# Patient Record
Sex: FEMALE | Race: WHITE | NOT HISPANIC OR LATINO | Employment: PART TIME | URBAN - METROPOLITAN AREA
[De-identification: names, ages, dates, MRNs, and addresses within clinical notes are randomized per-mention and may not be internally consistent; named-entity substitution may affect disease eponyms.]

---

## 2017-06-12 ENCOUNTER — RECORDS - HEALTHEAST (OUTPATIENT)
Dept: LAB | Facility: CLINIC | Age: 22
End: 2017-06-12

## 2017-06-12 LAB — HIV 1+2 AB+HIV1 P24 AG SERPL QL IA: NEGATIVE

## 2017-06-13 LAB — SYPHILIS RPR SCREEN - HISTORICAL: NORMAL

## 2017-06-15 LAB
HPV INTERPRETATION - HISTORICAL: NORMAL
HPV INTERPRETER - HISTORICAL: NORMAL

## 2019-03-05 ENCOUNTER — RECORDS - HEALTHEAST (OUTPATIENT)
Dept: LAB | Facility: CLINIC | Age: 24
End: 2019-03-05

## 2019-03-07 LAB — BACTERIA SPEC CULT: NO GROWTH

## 2019-03-14 ENCOUNTER — RECORDS - HEALTHEAST (OUTPATIENT)
Dept: LAB | Facility: CLINIC | Age: 24
End: 2019-03-14

## 2019-03-15 LAB — C TRACH DNA SPEC QL PROBE+SIG AMP: NEGATIVE

## 2019-03-18 LAB
M HOMINIS DNA SPEC QL NAA+PROBE: NEGATIVE
SPECIMEN SOURCE: ABNORMAL
SPECIMEN SOURCE: NORMAL
U PARVUM DNA SPEC QL NAA+PROBE: POSITIVE
U UREALYTICUM DNA SPEC QL NAA+PROBE: NEGATIVE

## 2020-01-22 ENCOUNTER — RECORDS - HEALTHEAST (OUTPATIENT)
Dept: LAB | Facility: CLINIC | Age: 25
End: 2020-01-22

## 2020-01-23 LAB — C TRACH DNA SPEC QL PROBE+SIG AMP: NEGATIVE

## 2021-03-15 ENCOUNTER — RECORDS - HEALTHEAST (OUTPATIENT)
Dept: LAB | Facility: CLINIC | Age: 26
End: 2021-03-15

## 2021-03-15 LAB
ALBUMIN SERPL-MCNC: 3.9 G/DL (ref 3.5–5)
ALP SERPL-CCNC: 59 U/L (ref 45–120)
ALT SERPL W P-5'-P-CCNC: 12 U/L (ref 0–45)
ANION GAP SERPL CALCULATED.3IONS-SCNC: 10 MMOL/L (ref 5–18)
AST SERPL W P-5'-P-CCNC: 15 U/L (ref 0–40)
BILIRUB SERPL-MCNC: 0.4 MG/DL (ref 0–1)
BUN SERPL-MCNC: 17 MG/DL (ref 8–22)
CALCIUM SERPL-MCNC: 9.1 MG/DL (ref 8.5–10.5)
CHLORIDE BLD-SCNC: 110 MMOL/L (ref 98–107)
CO2 SERPL-SCNC: 22 MMOL/L (ref 22–31)
CREAT SERPL-MCNC: 0.95 MG/DL (ref 0.6–1.1)
GFR SERPL CREATININE-BSD FRML MDRD: >60 ML/MIN/1.73M2
GLUCOSE BLD-MCNC: 93 MG/DL (ref 70–125)
HIV 1+2 AB+HIV1 P24 AG SERPL QL IA: NEGATIVE
POTASSIUM BLD-SCNC: 4.3 MMOL/L (ref 3.5–5)
PROT SERPL-MCNC: 6.8 G/DL (ref 6–8)
SODIUM SERPL-SCNC: 142 MMOL/L (ref 136–145)

## 2021-03-16 LAB
C TRACH DNA SPEC QL PROBE+SIG AMP: NEGATIVE
N GONORRHOEA DNA SPEC QL NAA+PROBE: NEGATIVE
T PALLIDUM AB SER QL: NEGATIVE

## 2021-03-18 LAB — LAMOTRIGINE SERPL-MCNC: 3.6 UG/ML (ref 2.5–15)

## 2021-10-15 ENCOUNTER — LAB REQUISITION (OUTPATIENT)
Dept: LAB | Facility: CLINIC | Age: 26
End: 2021-10-15
Payer: COMMERCIAL

## 2021-10-15 DIAGNOSIS — F39 UNSPECIFIED MOOD (AFFECTIVE) DISORDER (H): ICD-10-CM

## 2021-10-15 LAB — TSH SERPL DL<=0.005 MIU/L-ACNC: 2.12 UIU/ML (ref 0.3–5)

## 2021-10-15 PROCEDURE — 84443 ASSAY THYROID STIM HORMONE: CPT | Mod: ORL | Performed by: FAMILY MEDICINE

## 2022-02-21 ENCOUNTER — LAB REQUISITION (OUTPATIENT)
Dept: LAB | Facility: CLINIC | Age: 27
End: 2022-02-21

## 2022-02-21 DIAGNOSIS — F39 UNSPECIFIED MOOD (AFFECTIVE) DISORDER (H): ICD-10-CM

## 2022-02-21 LAB
ALBUMIN SERPL-MCNC: 4 G/DL (ref 3.5–5)
ALP SERPL-CCNC: 52 U/L (ref 45–120)
ALT SERPL W P-5'-P-CCNC: 11 U/L (ref 0–45)
ANION GAP SERPL CALCULATED.3IONS-SCNC: 9 MMOL/L (ref 5–18)
AST SERPL W P-5'-P-CCNC: 17 U/L (ref 0–40)
BILIRUB SERPL-MCNC: 0.6 MG/DL (ref 0–1)
BUN SERPL-MCNC: 13 MG/DL (ref 8–22)
CALCIUM SERPL-MCNC: 9.3 MG/DL (ref 8.5–10.5)
CHLORIDE BLD-SCNC: 109 MMOL/L (ref 98–107)
CO2 SERPL-SCNC: 21 MMOL/L (ref 22–31)
CREAT SERPL-MCNC: 0.83 MG/DL (ref 0.6–1.1)
GFR SERPL CREATININE-BSD FRML MDRD: >90 ML/MIN/1.73M2
GLUCOSE BLD-MCNC: 78 MG/DL (ref 70–125)
POTASSIUM BLD-SCNC: 4 MMOL/L (ref 3.5–5)
PROT SERPL-MCNC: 6.9 G/DL (ref 6–8)
SODIUM SERPL-SCNC: 139 MMOL/L (ref 136–145)
TSH SERPL DL<=0.005 MIU/L-ACNC: 2.67 UIU/ML (ref 0.3–5)

## 2022-02-21 PROCEDURE — 80053 COMPREHEN METABOLIC PANEL: CPT | Performed by: FAMILY MEDICINE

## 2022-02-21 PROCEDURE — 80201 ASSAY OF TOPIRAMATE: CPT | Performed by: FAMILY MEDICINE

## 2022-02-21 PROCEDURE — 84443 ASSAY THYROID STIM HORMONE: CPT | Performed by: FAMILY MEDICINE

## 2022-02-21 PROCEDURE — 80175 DRUG SCREEN QUAN LAMOTRIGINE: CPT | Performed by: FAMILY MEDICINE

## 2022-02-28 LAB
LAMOTRIGINE SERPL-MCNC: 3.3 UG/ML
TOPIRAMATE SERPL-MCNC: 6.2 UG/ML

## 2022-04-12 ENCOUNTER — LAB REQUISITION (OUTPATIENT)
Dept: LAB | Facility: CLINIC | Age: 27
End: 2022-04-12
Payer: COMMERCIAL

## 2022-04-12 DIAGNOSIS — Z11.3 ENCOUNTER FOR SCREENING FOR INFECTIONS WITH A PREDOMINANTLY SEXUAL MODE OF TRANSMISSION: ICD-10-CM

## 2022-04-12 PROCEDURE — 87591 N.GONORRHOEAE DNA AMP PROB: CPT | Mod: ORL | Performed by: FAMILY MEDICINE

## 2022-04-12 PROCEDURE — 87491 CHLMYD TRACH DNA AMP PROBE: CPT | Mod: ORL | Performed by: FAMILY MEDICINE

## 2022-04-13 LAB
C TRACH DNA SPEC QL PROBE+SIG AMP: NEGATIVE
N GONORRHOEA DNA SPEC QL NAA+PROBE: NEGATIVE

## 2023-02-10 ENCOUNTER — LAB REQUISITION (OUTPATIENT)
Dept: LAB | Facility: CLINIC | Age: 28
End: 2023-02-10
Payer: COMMERCIAL

## 2023-02-10 DIAGNOSIS — Z01.419 ENCOUNTER FOR GYNECOLOGICAL EXAMINATION (GENERAL) (ROUTINE) WITHOUT ABNORMAL FINDINGS: ICD-10-CM

## 2023-02-10 PROCEDURE — G0145 SCR C/V CYTO,THINLAYER,RESCR: HCPCS | Mod: ORL | Performed by: PHYSICIAN ASSISTANT

## 2023-02-15 LAB
BKR LAB AP GYN ADEQUACY: NORMAL
BKR LAB AP GYN INTERPRETATION: NORMAL
BKR LAB AP HPV REFLEX: NORMAL
BKR LAB AP PREVIOUS ABNORMAL: NORMAL
PATH REPORT.COMMENTS IMP SPEC: NORMAL
PATH REPORT.COMMENTS IMP SPEC: NORMAL
PATH REPORT.RELEVANT HX SPEC: NORMAL

## 2023-05-26 ENCOUNTER — LAB REQUISITION (OUTPATIENT)
Dept: LAB | Facility: CLINIC | Age: 28
End: 2023-05-26
Payer: COMMERCIAL

## 2023-05-26 DIAGNOSIS — F39 UNSPECIFIED MOOD (AFFECTIVE) DISORDER (H): ICD-10-CM

## 2023-05-26 LAB
ALBUMIN SERPL BCG-MCNC: 4.4 G/DL (ref 3.5–5.2)
ALP SERPL-CCNC: 56 U/L (ref 35–104)
ALT SERPL W P-5'-P-CCNC: 14 U/L (ref 10–35)
ANION GAP SERPL CALCULATED.3IONS-SCNC: 11 MMOL/L (ref 7–15)
AST SERPL W P-5'-P-CCNC: 18 U/L (ref 10–35)
BILIRUB SERPL-MCNC: 0.3 MG/DL
BUN SERPL-MCNC: 13.3 MG/DL (ref 6–20)
CALCIUM SERPL-MCNC: 9.2 MG/DL (ref 8.6–10)
CHLORIDE SERPL-SCNC: 107 MMOL/L (ref 98–107)
CREAT SERPL-MCNC: 0.91 MG/DL (ref 0.51–0.95)
DEPRECATED HCO3 PLAS-SCNC: 21 MMOL/L (ref 22–29)
GFR SERPL CREATININE-BSD FRML MDRD: 89 ML/MIN/1.73M2
GLUCOSE SERPL-MCNC: 91 MG/DL (ref 70–99)
POTASSIUM SERPL-SCNC: 4 MMOL/L (ref 3.4–5.3)
PROT SERPL-MCNC: 6.7 G/DL (ref 6.4–8.3)
SODIUM SERPL-SCNC: 139 MMOL/L (ref 136–145)

## 2023-05-26 PROCEDURE — 80201 ASSAY OF TOPIRAMATE: CPT | Mod: ORL | Performed by: FAMILY MEDICINE

## 2023-05-26 PROCEDURE — 80053 COMPREHEN METABOLIC PANEL: CPT | Mod: ORL | Performed by: FAMILY MEDICINE

## 2023-05-26 PROCEDURE — 80175 DRUG SCREEN QUAN LAMOTRIGINE: CPT | Mod: ORL | Performed by: FAMILY MEDICINE

## 2023-05-27 LAB
LAMOTRIGINE SERPL-MCNC: 4.7 UG/ML
TOPIRAMATE SERPL-MCNC: 5.8 UG/ML

## 2024-04-09 ENCOUNTER — LAB REQUISITION (OUTPATIENT)
Dept: LAB | Facility: CLINIC | Age: 29
End: 2024-04-09
Payer: COMMERCIAL

## 2024-04-09 DIAGNOSIS — Z11.3 ENCOUNTER FOR SCREENING FOR INFECTIONS WITH A PREDOMINANTLY SEXUAL MODE OF TRANSMISSION: ICD-10-CM

## 2024-04-09 PROCEDURE — 87389 HIV-1 AG W/HIV-1&-2 AB AG IA: CPT | Mod: ORL | Performed by: STUDENT IN AN ORGANIZED HEALTH CARE EDUCATION/TRAINING PROGRAM

## 2024-04-09 PROCEDURE — 86780 TREPONEMA PALLIDUM: CPT | Mod: ORL | Performed by: STUDENT IN AN ORGANIZED HEALTH CARE EDUCATION/TRAINING PROGRAM

## 2024-04-09 PROCEDURE — 87491 CHLMYD TRACH DNA AMP PROBE: CPT | Mod: ORL | Performed by: STUDENT IN AN ORGANIZED HEALTH CARE EDUCATION/TRAINING PROGRAM

## 2024-04-09 PROCEDURE — 86803 HEPATITIS C AB TEST: CPT | Mod: ORL | Performed by: STUDENT IN AN ORGANIZED HEALTH CARE EDUCATION/TRAINING PROGRAM

## 2024-04-10 LAB
C TRACH DNA SPEC QL PROBE+SIG AMP: NEGATIVE
HCV AB SERPL QL IA: NONREACTIVE
HIV 1+2 AB+HIV1 P24 AG SERPL QL IA: NONREACTIVE
N GONORRHOEA DNA SPEC QL NAA+PROBE: NEGATIVE
T PALLIDUM AB SER QL: NONREACTIVE

## 2024-05-07 ENCOUNTER — LAB REQUISITION (OUTPATIENT)
Dept: LAB | Facility: CLINIC | Age: 29
End: 2024-05-07
Payer: COMMERCIAL

## 2024-05-07 DIAGNOSIS — Z11.3 ENCOUNTER FOR SCREENING FOR INFECTIONS WITH A PREDOMINANTLY SEXUAL MODE OF TRANSMISSION: ICD-10-CM

## 2024-05-07 PROCEDURE — 87491 CHLMYD TRACH DNA AMP PROBE: CPT | Mod: ORL | Performed by: FAMILY MEDICINE

## 2024-05-09 LAB
C TRACH DNA SPEC QL PROBE+SIG AMP: NEGATIVE
N GONORRHOEA DNA SPEC QL NAA+PROBE: NEGATIVE

## 2024-06-03 ENCOUNTER — LAB REQUISITION (OUTPATIENT)
Dept: LAB | Facility: CLINIC | Age: 29
End: 2024-06-03
Payer: COMMERCIAL

## 2024-06-03 ENCOUNTER — TRANSFERRED RECORDS (OUTPATIENT)
Dept: HEALTH INFORMATION MANAGEMENT | Facility: CLINIC | Age: 29
End: 2024-06-03

## 2024-06-03 DIAGNOSIS — F31.12 BIPOLAR DISORDER, CURRENT EPISODE MANIC WITHOUT PSYCHOTIC FEATURES, MODERATE (H): ICD-10-CM

## 2024-06-03 LAB
LITHIUM SERPL-SCNC: 0.37 MMOL/L (ref 0.6–1.2)
PHQ9 SCORE: 1

## 2024-06-03 PROCEDURE — 80178 ASSAY OF LITHIUM: CPT | Mod: ORL | Performed by: FAMILY MEDICINE

## 2024-06-03 PROCEDURE — 80048 BASIC METABOLIC PNL TOTAL CA: CPT | Mod: ORL | Performed by: FAMILY MEDICINE

## 2024-06-03 PROCEDURE — 84443 ASSAY THYROID STIM HORMONE: CPT | Mod: ORL | Performed by: FAMILY MEDICINE

## 2024-06-03 PROCEDURE — 80175 DRUG SCREEN QUAN LAMOTRIGINE: CPT | Mod: ORL | Performed by: FAMILY MEDICINE

## 2024-06-04 ENCOUNTER — MEDICAL CORRESPONDENCE (OUTPATIENT)
Dept: HEALTH INFORMATION MANAGEMENT | Facility: CLINIC | Age: 29
End: 2024-06-04
Payer: COMMERCIAL

## 2024-06-04 LAB
ANION GAP SERPL CALCULATED.3IONS-SCNC: 11 MMOL/L (ref 7–15)
BUN SERPL-MCNC: 13.3 MG/DL (ref 6–20)
CALCIUM SERPL-MCNC: 9.5 MG/DL (ref 8.6–10)
CHLORIDE SERPL-SCNC: 109 MMOL/L (ref 98–107)
CREAT SERPL-MCNC: 0.92 MG/DL (ref 0.51–0.95)
DEPRECATED HCO3 PLAS-SCNC: 19 MMOL/L (ref 22–29)
EGFRCR SERPLBLD CKD-EPI 2021: 87 ML/MIN/1.73M2
GLUCOSE SERPL-MCNC: 87 MG/DL (ref 70–99)
POTASSIUM SERPL-SCNC: 4.1 MMOL/L (ref 3.4–5.3)
SODIUM SERPL-SCNC: 139 MMOL/L (ref 135–145)
TSH SERPL DL<=0.005 MIU/L-ACNC: 2.65 UIU/ML (ref 0.3–4.2)

## 2024-06-05 ENCOUNTER — TRANSCRIBE ORDERS (OUTPATIENT)
Dept: OTHER | Age: 29
End: 2024-06-05

## 2024-06-05 DIAGNOSIS — F31.12 BIPOLAR AFFECTIVE DISORDER, CURRENTLY MANIC, MODERATE (H): Primary | ICD-10-CM

## 2024-06-05 LAB — LAMOTRIGINE SERPL-MCNC: 2.4 UG/ML

## 2024-07-11 ENCOUNTER — VIRTUAL VISIT (OUTPATIENT)
Dept: NEUROLOGY | Facility: CLINIC | Age: 29
End: 2024-07-11
Payer: COMMERCIAL

## 2024-07-11 VITALS — WEIGHT: 116 LBS | BODY MASS INDEX: 21.35 KG/M2 | HEIGHT: 62 IN

## 2024-07-11 DIAGNOSIS — F39 MOOD DISORDER (H): Primary | ICD-10-CM

## 2024-07-11 PROCEDURE — 99204 OFFICE O/P NEW MOD 45 MIN: CPT | Mod: 95 | Performed by: PSYCHIATRY & NEUROLOGY

## 2024-07-11 PROCEDURE — G2211 COMPLEX E/M VISIT ADD ON: HCPCS | Mod: 95 | Performed by: PSYCHIATRY & NEUROLOGY

## 2024-07-11 RX ORDER — LAMOTRIGINE 100 MG/1
TABLET, EXTENDED RELEASE ORAL
COMMUNITY
Start: 2024-04-19

## 2024-07-11 RX ORDER — TOPIRAMATE 100 MG/1
TABLET, FILM COATED ORAL
COMMUNITY
Start: 2024-03-21

## 2024-07-11 RX ORDER — TOPIRAMATE 50 MG/1
TABLET, FILM COATED ORAL
COMMUNITY
Start: 2023-09-25

## 2024-07-11 RX ORDER — FEXOFENADINE HYDROCHLORIDE 180 MG/1
TABLET, FILM COATED ORAL
COMMUNITY
Start: 2024-04-12

## 2024-07-11 RX ORDER — DESOGESTREL AND ETHINYL ESTRADIOL 0.15-0.03
KIT ORAL
COMMUNITY
Start: 2024-04-12

## 2024-07-11 ASSESSMENT — PAIN SCALES - GENERAL: PAINLEVEL: NO PAIN (0)

## 2024-07-11 NOTE — LETTER
7/11/2024      Ana Vanegas  [address]  [place] [address]      Dear Colleague,    Thank you for referring your patient, Ana Vanegas, to the [facility]. Please see a copy of my visit note below.    Virtual Visit Details    Type of service:  Video Visit   Video Start Time: 10:26 AM  Video End Time: 11:01 AM    Originating Location (pt. Location): The patient's home  Distant Location (provider location):    Platform used for Video Visit: [employer]    Demographic information:     HPI:      The patient presents to this clinic for an intake on July 11, 2024.  She was referred by Dr. Lee from [facility] with a possible diagnosis of bipolar affective disorder type II, she was previously diagnosed with major depressive disorder.  She also was previously diagnosed with an eating disorder with binge eating episodes and was followed years ago through the family clinic briefly.  The patient presented on Topamax I believe 50 mg in the morning and 100 mg at night.  Zoloft 50 mg and lamotrigine 100 mg 24-hour tablet a day although we did not have verification of those medications yet.  There was limited collateral information available at the time of the intake but the patient apparently had been diagnosed and treated for major depressive disorder for many years and has been through the [place] program as an outpatient 5 years ago with binge eating symptoms.  She has been treated with a variety of medications, it appears mostly for depressive and anxious symptoms but a few years ago was started on Lamictal.  She is also on Topamax which was used in part due to her eating disorder symptoms apparently.  She reports that over the last year the Lamictal dosage has been decreased.  She was briefly tried on lithium a few weeks earlier but stopped that because she was worried about the potential for weight gain as currently her weight and eating behaviors are under  very good control.  Her last Lamictal level on Micki 3 was 2.4 with reassuring electrolytes and TSH levels.  At the time of the intake she was not looking for any medication changes and stated she was doing relatively well.  She stated she had, what appeared to be, a manic episode 2 months earlier when she got a DUI.  She also had been engaged in more promiscuous behavior and had broken up recently with her boyfriend because of this.  She reports that she had been gaping more with nicotine and drinking a lot more.  She states that since her DUI she has not used any alcohol and feels as if things are currently stable but she is worried about a future episode and was hoping to establish with a psychiatrist as per the recommendation of Dr. Lee.    HPI:  Please see above.  The patient presents here stating she was referred by Dr. Lee.  I will try and clarify whether this is for a second opinion or whether she wishes to establish long-term care through this clinic.  The patient apparently was diagnosed with bipolar affective disorder type II and a month or 2 earlier started to develop more hypomanic and possibly manic symptoms.  She was arrested for a DUI at that time and has engaged in more impulsive actions.  She had briefly been tried on lithium but she did not like that medication so stopped it.  She has been on Topamax chronically and continues on that medication.  She denies any history of any suicidality.  No history of any psychosis.  She states that she started developing depressive and anxious symptoms years ago but only recently developed manic or hypomanic symptoms.  She denies having any thoughts of harming herself or anybody else.  No hallucinations or delusions.  She states a month or 2 ago she was sleeping less but now is sleeping her typical 6 hours a night.  She has stopped using alcohol and has been getting better sleep and believes things have normalized.  She denies having any significant  cognitive deficits.  No obvious tremors or side effects to the medication.  She was unsure of any past medication trials but does not believe she has been on other mood stabilizing agents.    Past medical history:  Allergies: Amoxicillin  Chronic medical issues: The patient denies having any recent or chronic medical concerns.  She states she is chronically run low blood pressures but has been asymptomatic.  No history of any traumatic brain injuries no history of any diabetes or hypertension.  Recent medical concerns: Patient denies that has been any recent change in her medical status.  No new health concerns.    Past psychiatric history:  The patient has been treated for bipolar affective disorder type II through her primary care physician Dr. Lee.  She may have had an eating disorder years ago but that recently has been under fairly good control.  No history of any psychiatric hospitalizations.  No history of any suicidality or psychosis.  She is engaged in some impulsive and reckless behaviors over the last month or 2 which has led to consideration for a bipolar type II diagnoses.  She has never been psychiatrically hospitalized.  She was diagnosed with a binge eating disorder in the past which is why she was placed on Topamax.  She apparently had been on higher doses of that medication a year ago.  I had the process of trying to obtain collateral information.    Chemical dependency issues:  Please see above.  The patient had a recent DUI about a month or 2 earlier which she believes was related to a manic or hypomanic episode when she was under other situational stressors.  She has not had anything to drink since then.  No history of any illicit drug use or prescription medication misuse.  She has never undergone chemical dependency assessment or treatment otherwise.    Family history:  She reports her father may have had undiagnosed bipolar affective disorder.  He is .  There is no family  history of any chemical dependency issues or other members with bipolar illness.  No history of any psychosis.    Social history:   The patient was born and raised in [place].  She has an older brother and a younger sister.  Her parents were never  but her father  7 years earlier.  Her dad struggled with some issues around hoarding and may have had bipolar illness.  As a child she described herself as quite anxious and a perfectionist.  She is obtained a college degree and is currently in a master's program for dairy nutrition.  She recently broke up with a fiancé and had been living with him so moved back home with her mother.  That is going well and she describes a very close family.  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    Review of systems:  The patient denies having any recent health concerns or complaints. No shortness of breath. No chest pain. No abnormal movements or tremors. No significant change in their health status.      Mental status exam:  Appearance:   Comfortable and calm. Adequately groomed. No evidence of any distress or discomfort. No asymmetries and no tremors.  Behavior:   Able to participate. No agitation. No irritability or lability. Appears interested. No reports of any recent behavior problems.  Speech: Communicating appropriately. Not pressured. Not rambling. Able to dialogue.  Mood/Affect: Does not appear to be any significant distress. No lability or irritability. No agitation. Not significantly depressed. No jerome.  Thought Content: No hallucinations or delusions. No apparent hallucinations   Suicidal or Homicidal Thoughts:  No suicidal or homicidal ideation.  Thought Process/Formulation:   Able to track and follow appropriately. No racing thoughts. Participating appropriately.  Associations:  Appropriate in conversation. No obvious deficits.  Fund of Knowledge:  No obvious change or significant deficits noted.  Attention/Concentration:    Grossly intact. Tracking adequately. No racing thoughts. Not disorganized.  Insight:  Grossly intact. Adequate. No obvious change.  Judgement:  Grossly intact.   Memory:  Grossly intact.   Motor Status:  No recent change reported. No asymmetry. No current tremor.  Orientation: Grossly oriented.     Diagnoses:  Bipolar affective disorder, type II, by history  Major depressive disorder, NOS, by history  Eating disorder, by history, not currently active      Assessment:  The patient presents with history and recent treatments as described above.  She apparently had been on 300 mg of Lamictal over a year ago but is now on 100 mg a day.  She may have had some manic symptoms present a few weeks earlier but that has resolved with abstinence from alcohol and better sleep.  There is no evidence of any suicidality or psychosis.  She seems to be tolerating the medication and was referred here for further assessment and I believe possible treatment through this clinic.  I will try and clarify that.  She does not feel she needs any medication changes at this time.    Plan:  I will make no medication changes at this time and we will attempt to clarify recent treatments and obtain her old records.  She reports that she had never been on other mood stabilizing agents but apparently did not tolerate lithium or at least had concerns about the potential for weight gain.  Her impulse control issues which occurred a couple of months prior may have been related to excessive alcohol use and lack of sleep.  She seems to be doing relatively well at this time.  She has never had prior trials of Seroquel, risperidone, Zyprexa or any other mood stabilizing agents but I do not have her old records yet.  I will make no medication changes at this time.  We will have the patient return to this clinic in 3 to 4 months for further assessment and she agrees to contact us before then with any concerns.  I did discuss, with the patient, the risks and  benefits of the medication and treatment plan. The patient agreed to utilize emergency services should that become necessary. The patient agreed to contact us with any nonemergent questions or concerns prior to the next visit. We discussed utilization of the messaging system in this platform as well as utilization of the 911 service should that become necessary.    Lopez Walter MD      Again, thank you for allowing me to participate in the care of your patient.        Sincerely,        Lopez Walter MD

## 2024-07-11 NOTE — NURSING NOTE
Current patient location: Patient declined to provide     Is the patient currently in the state of [place]? YES    Visit mode:VIDEO    If the visit is dropped, the patient can be reconnected by: VIDEO VISIT: Text to cell phone:   Telephone Information:   Mobile [phone]       Will anyone else be joining the visit? NO  (If patient encounters technical issues they should call [phone]    How would you like to obtain your AVS? MyChart    Are changes needed to the allergy or medication list? No    Are refills needed on medications prescribed by this physician? NO    Reason for visit: Consult    Comfort [facility]

## 2024-07-11 NOTE — PROGRESS NOTES
Virtual Visit Details    Type of service:  Video Visit   Video Start Time: 10:26 AM  Video End Time: 11:01 AM    Originating Location (pt. Location): The patient's home  Distant Location (provider location):    Platform used for Video Visit: [employer]    Demographic information:     HPI:      The patient presents to this clinic for an intake on July 11, 2024.  She was referred by Dr. Lee from [facility] with a possible diagnosis of bipolar affective disorder type II, she was previously diagnosed with major depressive disorder.  She also was previously diagnosed with an eating disorder with binge eating episodes and was followed years ago through the family clinic briefly.  The patient presented on Topamax I believe 50 mg in the morning and 100 mg at night.  Zoloft 50 mg and lamotrigine 100 mg 24-hour tablet a day although we did not have verification of those medications yet.  There was limited collateral information available at the time of the intake but the patient apparently had been diagnosed and treated for major depressive disorder for many years and has been through the [place] program as an outpatient 5 years ago with binge eating symptoms.  She has been treated with a variety of medications, it appears mostly for depressive and anxious symptoms but a few years ago was started on Lamictal.  She is also on Topamax which was used in part due to her eating disorder symptoms apparently.  She reports that over the last year the Lamictal dosage has been decreased.  She was briefly tried on lithium a few weeks earlier but stopped that because she was worried about the potential for weight gain as currently her weight and eating behaviors are under very good control.  Her last Lamictal level on Micki 3 was 2.4 with reassuring electrolytes and TSH levels.  At the time of the intake she was not looking for any medication changes and stated she was doing relatively well.  She stated she had, what appeared to be, a  manic episode 2 months earlier when she got a DUI.  She also had been engaged in more promiscuous behavior and had broken up recently with her boyfriend because of this.  She reports that she had been gaping more with nicotine and drinking a lot more.  She states that since her DUI she has not used any alcohol and feels as if things are currently stable but she is worried about a future episode and was hoping to establish with a psychiatrist as per the recommendation of Dr. Lee.    HPI:  Please see above.  The patient presents here stating she was referred by Dr. Lee.  I will try and clarify whether this is for a second opinion or whether she wishes to establish long-term care through this clinic.  The patient apparently was diagnosed with bipolar affective disorder type II and a month or 2 earlier started to develop more hypomanic and possibly manic symptoms.  She was arrested for a DUI at that time and has engaged in more impulsive actions.  She had briefly been tried on lithium but she did not like that medication so stopped it.  She has been on Topamax chronically and continues on that medication.  She denies any history of any suicidality.  No history of any psychosis.  She states that she started developing depressive and anxious symptoms years ago but only recently developed manic or hypomanic symptoms.  She denies having any thoughts of harming herself or anybody else.  No hallucinations or delusions.  She states a month or 2 ago she was sleeping less but now is sleeping her typical 6 hours a night.  She has stopped using alcohol and has been getting better sleep and believes things have normalized.  She denies having any significant cognitive deficits.  No obvious tremors or side effects to the medication.  She was unsure of any past medication trials but does not believe she has been on other mood stabilizing agents.    Past medical history:  Allergies: Amoxicillin  Chronic medical issues: The  patient denies having any recent or chronic medical concerns.  She states she is chronically run low blood pressures but has been asymptomatic.  No history of any traumatic brain injuries no history of any diabetes or hypertension.  Recent medical concerns: Patient denies that has been any recent change in her medical status.  No new health concerns.    Past psychiatric history:  The patient has been treated for bipolar affective disorder type II through her primary care physician Dr. Lee.  She may have had an eating disorder years ago but that recently has been under fairly good control.  No history of any psychiatric hospitalizations.  No history of any suicidality or psychosis.  She is engaged in some impulsive and reckless behaviors over the last month or 2 which has led to consideration for a bipolar type II diagnoses.  She has never been psychiatrically hospitalized.  She was diagnosed with a binge eating disorder in the past which is why she was placed on Topamax.  She apparently had been on higher doses of that medication a year ago.  I had the process of trying to obtain collateral information.    Chemical dependency issues:  Please see above.  The patient had a recent DUI about a month or 2 earlier which she believes was related to a manic or hypomanic episode when she was under other situational stressors.  She has not had anything to drink since then.  No history of any illicit drug use or prescription medication misuse.  She has never undergone chemical dependency assessment or treatment otherwise.    Family history:  She reports her father may have had undiagnosed bipolar affective disorder.  He is .  There is no family history of any chemical dependency issues or other members with bipolar illness.  No history of any psychosis.    Social history:   The patient was born and raised in [place].  She has an older brother and a younger sister.  Her parents were never  but her  father  7 years earlier.  Her dad struggled with some issues around hoarding and may have had bipolar illness.  As a child she described herself as quite anxious and a perfectionist.  She is obtained a college degree and is currently in a master's program for dairy nutrition.  She recently broke up with a fiancé and had been living with him so moved back home with her mother.  That is going well and she describes a very close family.  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    Review of systems:  The patient denies having any recent health concerns or complaints. No shortness of breath. No chest pain. No abnormal movements or tremors. No significant change in their health status.      Mental status exam:  Appearance:   Comfortable and calm. Adequately groomed. No evidence of any distress or discomfort. No asymmetries and no tremors.  Behavior:   Able to participate. No agitation. No irritability or lability. Appears interested. No reports of any recent behavior problems.  Speech: Communicating appropriately. Not pressured. Not rambling. Able to dialogue.  Mood/Affect: Does not appear to be any significant distress. No lability or irritability. No agitation. Not significantly depressed. No jerome.  Thought Content: No hallucinations or delusions. No apparent hallucinations   Suicidal or Homicidal Thoughts:  No suicidal or homicidal ideation.  Thought Process/Formulation:   Able to track and follow appropriately. No racing thoughts. Participating appropriately.  Associations:  Appropriate in conversation. No obvious deficits.  Fund of Knowledge:  No obvious change or significant deficits noted.  Attention/Concentration:   Grossly intact. Tracking adequately. No racing thoughts. Not disorganized.  Insight:  Grossly intact. Adequate. No obvious change.  Judgement:  Grossly intact.   Memory:  Grossly intact.   Motor Status:  No recent change reported. No asymmetry. No current  tremor.  Orientation: Grossly oriented.     Diagnoses:  Bipolar affective disorder, type II, by history  Major depressive disorder, NOS, by history  Eating disorder, by history, not currently active      Assessment:  The patient presents with history and recent treatments as described above.  She apparently had been on 300 mg of Lamictal over a year ago but is now on 100 mg a day.  She may have had some manic symptoms present a few weeks earlier but that has resolved with abstinence from alcohol and better sleep.  There is no evidence of any suicidality or psychosis.  She seems to be tolerating the medication and was referred here for further assessment and I believe possible treatment through this clinic.  I will try and clarify that.  She does not feel she needs any medication changes at this time.    Plan:  I will make no medication changes at this time and we will attempt to clarify recent treatments and obtain her old records.  She reports that she had never been on other mood stabilizing agents but apparently did not tolerate lithium or at least had concerns about the potential for weight gain.  Her impulse control issues which occurred a couple of months prior may have been related to excessive alcohol use and lack of sleep.  She seems to be doing relatively well at this time.  She has never had prior trials of Seroquel, risperidone, Zyprexa or any other mood stabilizing agents but I do not have her old records yet.  I will make no medication changes at this time.  We will have the patient return to this clinic in 3 to 4 months for further assessment and she agrees to contact us before then with any concerns.  I did discuss, with the patient, the risks and benefits of the medication and treatment plan. The patient agreed to utilize emergency services should that become necessary. The patient agreed to contact us with any nonemergent questions or concerns prior to the next visit. We discussed utilization of  the messaging system in this platform as well as utilization of the [employer] service should that become necessary.    Lopez Walter MD

## 2024-07-11 NOTE — PATIENT INSTRUCTIONS
Please obtain the patient's old records from her [facility] clinic.  I will make no medication changes at this time.  She should return to this clinic in 3 to 4 weeks for medication check and agrees to contact us before then with any concerns.

## 2024-07-20 ENCOUNTER — HEALTH MAINTENANCE LETTER (OUTPATIENT)
Age: 29
End: 2024-07-20

## 2024-09-20 ENCOUNTER — LAB REQUISITION (OUTPATIENT)
Dept: LAB | Facility: CLINIC | Age: 29
End: 2024-09-20
Payer: COMMERCIAL

## 2024-09-20 DIAGNOSIS — Z11.3 ENCOUNTER FOR SCREENING FOR INFECTIONS WITH A PREDOMINANTLY SEXUAL MODE OF TRANSMISSION: ICD-10-CM

## 2024-09-20 PROCEDURE — 87491 CHLMYD TRACH DNA AMP PROBE: CPT | Mod: ORL | Performed by: FAMILY MEDICINE

## 2024-09-21 LAB
C TRACH DNA SPEC QL PROBE+SIG AMP: NEGATIVE
N GONORRHOEA DNA SPEC QL NAA+PROBE: NEGATIVE

## 2024-09-26 ENCOUNTER — VIRTUAL VISIT (OUTPATIENT)
Dept: NEUROLOGY | Facility: CLINIC | Age: 29
End: 2024-09-26
Payer: COMMERCIAL

## 2024-09-26 DIAGNOSIS — F39 MOOD DISORDER (H): Primary | ICD-10-CM

## 2024-09-26 PROCEDURE — 99214 OFFICE O/P EST MOD 30 MIN: CPT | Mod: 95 | Performed by: PSYCHIATRY & NEUROLOGY

## 2024-09-26 RX ORDER — SERTRALINE HYDROCHLORIDE 100 MG/1
100 TABLET, FILM COATED ORAL DAILY
Qty: 30 TABLET | Refills: 3 | Status: SHIPPED | OUTPATIENT
Start: 2024-09-26

## 2024-09-26 RX ORDER — HYDROXYZINE PAMOATE 25 MG/1
25 CAPSULE ORAL 2 TIMES DAILY PRN
Qty: 60 CAPSULE | Refills: 3 | Status: SHIPPED | OUTPATIENT
Start: 2024-09-26

## 2024-09-26 RX ORDER — LITHIUM CARBONATE 150 MG/1
CAPSULE ORAL
COMMUNITY

## 2024-09-26 NOTE — PROGRESS NOTES
Virtual Visit Details    Type of service:  Video Visit   Video Start Time: 11:40 AM  Video End Time: 11:56 AM    Originating Location (pt. Location): The patient's home  Distant Location (provider location):    Platform used for Video Visit: [employer]    Demographic information:     HPI:      The patient presents to this clinic for an intake on July 11, 2024.  She was referred by Dr. Lee from [facility] with a possible diagnosis of bipolar affective disorder type II, she was previously diagnosed with major depressive disorder.  She also was previously diagnosed with an eating disorder with binge eating episodes and was followed years ago through the family clinic briefly.  The patient presented on Topamax I believe 50 mg in the morning and 100 mg at night.  Zoloft 50 mg and lamotrigine 100 mg 24-hour tablet a day although we did not have verification of those medications yet.  There was limited collateral information available at the time of the intake but the patient apparently had been diagnosed and treated for major depressive disorder for many years and has been through the [place] program as an outpatient 5 years ago with binge eating symptoms.  She has been treated with a variety of medications, it appears mostly for depressive and anxious symptoms but a few years ago was started on Lamictal.  She is also on Topamax which was used in part due to her eating disorder symptoms apparently.  She reports that over the last year the Lamictal dosage has been decreased.  She was briefly tried on lithium a few weeks earlier but stopped that because she was worried about the potential for weight gain as currently her weight and eating behaviors are under very good control.  Her last Lamictal level on Micki 3 was 2.4 with reassuring electrolytes and TSH levels.  At the time of the intake she was not looking for any medication changes and stated she was doing relatively well.  She stated she had, what appeared to be, a  manic episode 2 months earlier when she got a DUI.  She also had been engaged in more promiscuous behavior and had broken up recently with her boyfriend because of this.  She reports that she had been gaping more with nicotine and drinking a lot more.  She states that since her DUI she has not used any alcohol and feels as if things are currently stable but she is worried about a future episode and was hoping to establish with a psychiatrist as per the recommendation of Dr. Lee.    HPI:  The patient had called in to set up this appointment stating that she had been a bit more anxious lately.  Her primary care doctor, Dr. Lee had started her on Eskalith and apparently she is now on 150 mg twice a day of that.  She continues on Lamictal through Dr. Lee I believe at 100 mg a day and Topamax at either 150 or 100 mg a day.  He is apparently continuing to monitor and reorder those medications.  The patient stated she had asked for a medication change through her primary care doctor due to increased anxiety.  She denied that she was manic or hypomanic.  No thoughts of suicide.  No significant depression.  Her sleep is good at 6 to 7 hours a night and socially she was doing well.  She denied having any hallucinations or delusions.  She reported that she is doing well socially and there is been no change in her concentration.  We again reviewed the risks and benefits of the medication.    Review of systems:  The patient denies having any recent health concerns or complaints. No shortness of breath. No chest pain. No abnormal movements or tremors. No significant change in their health status.      Mental status exam:  Appearance: Patient does not appear uncomfortable.  There is no obvious pain or shortness of breath.  No new issues or concerns are reported.   Behavior: Patient is cooperative.  Able to maintain eye contact. Able to interact appropriately.  No agitation.  Not restless.  No reports of any  recent behavioral dyscontrol.  The patient does participate and is cooperative.  Able to initiate.  Speech: Participating with intact sentence structure.  No pressured or rambling quality.  Answers are appropriate and consistent.  Able to dialogue and initiate.  Mood/Affect: No obvious depression or anxiety.  No irritability or lability.  No evidence of any jerome.  Thought Content:  No evidence of any psychosis. The patient denies any psychotic symptoms. No reports of any recent psychosis.  Suicidal or Homicidal Thoughts: None apparent or reported.  The patient denies having any homicidal or suicidal ideation.  Thought Process/Formulation: Participating.  Able to follow conversation.  No thought blocking.  No racing thoughts.  Not disorganized.  No evidence of any pressured thinking.  Associations:  Grossly intact.  Processing information appropriately.  Able to track conversation..  Fund of Knowledge:  Grossly intact.   Attention/Concentration: Concentration appears adequate.  The patient is following and participating well.  Attentive.  No disorganization.  No racing thoughts.  Insight:  Grossly intact.   Judgement:  Grossly intact.   Memory:  Grossly intact.   Motor Status:  No recent change reported. No current tremor.  Orientation: Grossly oriented..    Diagnoses:  Bipolar affective disorder, type II, by history  Major depressive disorder, NOS, by history  Eating disorder, by history, not currently active      Assessment:  The patient complains of some return of same anxiety which has been chronic but recently a bit worse.  She denies it is manic or hypomanic behavior.  She had been seen by her primary care doctor who is apparently managing her Topamax and Lamictal.  That doctor did put her on Eskalith and she is now 150 mg a day.  No evidence of any suicidality, psychosis or jerome.    Plan:  I am going to increase her Zoloft to 100 mg a day.  I will continue with the lithium, lamotrigine and Topamax as her  primary clinic is managing.  I did add some low-dose hydroxyzine.  Risks and benefits were discussed.  I did inform the patient I would no longer be available through this clinic.    I did discuss, with the patient, the risks and benefits of the medication and treatment plan. The patient agreed to utilize emergency services should that become necessary. The patient agreed to contact us with any nonemergent questions or concerns prior to the next visit. We discussed utilization of the messaging system in this platform as well as utilization of the 911 service should that become necessary.    Lopez Walter MD

## 2024-09-26 NOTE — PATIENT INSTRUCTIONS
I am going to increase her Zoloft to 100 mg a day.  I will continue with the lithium, lamotrigine and Topamax as her primary clinic is managing.  I did add some low-dose hydroxyzine.  Risks and benefits were discussed.  I did inform the patient I would no longer be available through this clinic.

## 2024-09-26 NOTE — LETTER
9/26/2024      Ana Vanegas  [address] [place] [address]      Dear Colleague,    Thank you for referring your patient, Ana Vanegas, to the [facility]. Please see a copy of my visit note below.    Virtual Visit Details    Type of service:  Video Visit   Video Start Time: 11:40 AM  Video End Time: 11:56 AM    Originating Location (pt. Location): The patient's home  Distant Location (provider location):    Platform used for Video Visit: [employer]    Demographic information:     HPI:      The patient presents to this clinic for an intake on July 11, 2024.  She was referred by Dr. Lee from [facility] with a possible diagnosis of bipolar affective disorder type II, she was previously diagnosed with major depressive disorder.  She also was previously diagnosed with an eating disorder with binge eating episodes and was followed years ago through the family clinic briefly.  The patient presented on Topamax I believe 50 mg in the morning and 100 mg at night.  Zoloft 50 mg and lamotrigine 100 mg 24-hour tablet a day although we did not have verification of those medications yet.  There was limited collateral information available at the time of the intake but the patient apparently had been diagnosed and treated for major depressive disorder for many years and has been through the [place] program as an outpatient 5 years ago with binge eating symptoms.  She has been treated with a variety of medications, it appears mostly for depressive and anxious symptoms but a few years ago was started on Lamictal.  She is also on Topamax which was used in part due to her eating disorder symptoms apparently.  She reports that over the last year the Lamictal dosage has been decreased.  She was briefly tried on lithium a few weeks earlier but stopped that because she was worried about the potential for weight gain as currently her weight and eating behaviors are under  very good control.  Her last Lamictal level on Micki 3 was 2.4 with reassuring electrolytes and TSH levels.  At the time of the intake she was not looking for any medication changes and stated she was doing relatively well.  She stated she had, what appeared to be, a manic episode 2 months earlier when she got a DUI.  She also had been engaged in more promiscuous behavior and had broken up recently with her boyfriend because of this.  She reports that she had been gaping more with nicotine and drinking a lot more.  She states that since her DUI she has not used any alcohol and feels as if things are currently stable but she is worried about a future episode and was hoping to establish with a psychiatrist as per the recommendation of Dr. Lee.    HPI:  The patient had called in to set up this appointment stating that she had been a bit more anxious lately.  Her primary care doctor, Dr. Lee had started her on Eskalith and apparently she is now on 150 mg twice a day of that.  She continues on Lamictal through Dr. Lee I believe at 100 mg a day and Topamax at either 150 or 100 mg a day.  He is apparently continuing to monitor and reorder those medications.  The patient stated she had asked for a medication change through her primary care doctor due to increased anxiety.  She denied that she was manic or hypomanic.  No thoughts of suicide.  No significant depression.  Her sleep is good at 6 to 7 hours a night and socially she was doing well.  She denied having any hallucinations or delusions.  She reported that she is doing well socially and there is been no change in her concentration.  We again reviewed the risks and benefits of the medication.    Review of systems:  The patient denies having any recent health concerns or complaints. No shortness of breath. No chest pain. No abnormal movements or tremors. No significant change in their health status.      Mental status exam:  Appearance: Patient does not  appear uncomfortable.  There is no obvious pain or shortness of breath.  No new issues or concerns are reported.   Behavior: Patient is cooperative.  Able to maintain eye contact. Able to interact appropriately.  No agitation.  Not restless.  No reports of any recent behavioral dyscontrol.  The patient does participate and is cooperative.  Able to initiate.  Speech: Participating with intact sentence structure.  No pressured or rambling quality.  Answers are appropriate and consistent.  Able to dialogue and initiate.  Mood/Affect: No obvious depression or anxiety.  No irritability or lability.  No evidence of any jerome.  Thought Content:  No evidence of any psychosis. The patient denies any psychotic symptoms. No reports of any recent psychosis.  Suicidal or Homicidal Thoughts: None apparent or reported.  The patient denies having any homicidal or suicidal ideation.  Thought Process/Formulation: Participating.  Able to follow conversation.  No thought blocking.  No racing thoughts.  Not disorganized.  No evidence of any pressured thinking.  Associations:  Grossly intact.  Processing information appropriately.  Able to track conversation..  Fund of Knowledge:  Grossly intact.   Attention/Concentration: Concentration appears adequate.  The patient is following and participating well.  Attentive.  No disorganization.  No racing thoughts.  Insight:  Grossly intact.   Judgement:  Grossly intact.   Memory:  Grossly intact.   Motor Status:  No recent change reported. No current tremor.  Orientation: Grossly oriented..    Diagnoses:  Bipolar affective disorder, type II, by history  Major depressive disorder, NOS, by history  Eating disorder, by history, not currently active      Assessment:  The patient complains of some return of same anxiety which has been chronic but recently a bit worse.  She denies it is manic or hypomanic behavior.  She had been seen by her primary care doctor who is apparently managing her Topamax  and Lamictal.  That doctor did put her on Eskalith and she is now 150 mg a day.  No evidence of any suicidality, psychosis or jerome.    Plan:  I am going to increase her Zoloft to 100 mg a day.  I will continue with the lithium, lamotrigine and Topamax as her primary clinic is managing.  I did add some low-dose hydroxyzine.  Risks and benefits were discussed.  I did inform the patient I would no longer be available through this clinic.    I did discuss, with the patient, the risks and benefits of the medication and treatment plan. The patient agreed to utilize emergency services should that become necessary. The patient agreed to contact us with any nonemergent questions or concerns prior to the next visit. We discussed utilization of the messaging system in this platform as well as utilization of the 911 service should that become necessary.    Lopez Walter MD      Again, thank you for allowing me to participate in the care of your patient.        Sincerely,        Lopez Walter MD

## 2024-09-26 NOTE — NURSING NOTE
Current patient location:  work     Is the patient currently in the state of [place]? YES    Visit mode:VIDEO    If the visit is dropped, the patient can be reconnected by: VIDEO VISIT: Send to e-mail at: [email or web address]    Will anyone else be joining the visit? NO  (If patient encounters technical issues they should call [phone]    How would you like to obtain your AVS? MyChart    Are changes needed to the allergy or medication list? Yes PT is allergic to dust mites.     Are refills needed on medications prescribed by this physician? NO- discussing other options     Rooming Documentation:  Not applicable    Reason for visit: RECHECK (Discuss other medication options )    Nikki Dolan VVF    PT states she has been feeling anxious.    Ortho Addendum

## 2024-11-22 ENCOUNTER — LAB REQUISITION (OUTPATIENT)
Dept: LAB | Facility: CLINIC | Age: 29
End: 2024-11-22
Payer: COMMERCIAL

## 2024-11-22 DIAGNOSIS — N90.89 OTHER SPECIFIED NONINFLAMMATORY DISORDERS OF VULVA AND PERINEUM: ICD-10-CM

## 2024-11-22 PROCEDURE — 87529 HSV DNA AMP PROBE: CPT | Performed by: OBSTETRICS & GYNECOLOGY

## 2024-11-23 LAB
HSV1 DNA SPEC QL NAA+PROBE: NOT DETECTED
HSV2 DNA SPEC QL NAA+PROBE: NOT DETECTED
SPECIMEN TYPE: NORMAL

## 2025-01-15 ENCOUNTER — LAB REQUISITION (OUTPATIENT)
Dept: LAB | Facility: CLINIC | Age: 30
End: 2025-01-15
Payer: COMMERCIAL

## 2025-01-15 DIAGNOSIS — F31.12 BIPOLAR DISORDER, CURRENT EPISODE MANIC WITHOUT PSYCHOTIC FEATURES, MODERATE (H): ICD-10-CM

## 2025-01-15 PROCEDURE — 80175 DRUG SCREEN QUAN LAMOTRIGINE: CPT | Mod: ORL | Performed by: FAMILY MEDICINE

## 2025-01-15 PROCEDURE — 80053 COMPREHEN METABOLIC PANEL: CPT | Mod: ORL | Performed by: FAMILY MEDICINE

## 2025-01-16 LAB
ALBUMIN SERPL BCG-MCNC: 4.7 G/DL (ref 3.5–5.2)
ALP SERPL-CCNC: 67 U/L (ref 40–150)
ALT SERPL W P-5'-P-CCNC: 14 U/L (ref 0–50)
ANION GAP SERPL CALCULATED.3IONS-SCNC: 11 MMOL/L (ref 7–15)
AST SERPL W P-5'-P-CCNC: 20 U/L (ref 0–45)
BILIRUB SERPL-MCNC: 0.3 MG/DL
BUN SERPL-MCNC: 16 MG/DL (ref 6–20)
CALCIUM SERPL-MCNC: 9.9 MG/DL (ref 8.8–10.4)
CHLORIDE SERPL-SCNC: 106 MMOL/L (ref 98–107)
CREAT SERPL-MCNC: 0.87 MG/DL (ref 0.51–0.95)
EGFRCR SERPLBLD CKD-EPI 2021: >90 ML/MIN/1.73M2
GLUCOSE SERPL-MCNC: 89 MG/DL (ref 70–99)
HCO3 SERPL-SCNC: 22 MMOL/L (ref 22–29)
POTASSIUM SERPL-SCNC: 4.2 MMOL/L (ref 3.4–5.3)
PROT SERPL-MCNC: 7.6 G/DL (ref 6.4–8.3)
SODIUM SERPL-SCNC: 139 MMOL/L (ref 135–145)

## 2025-01-17 LAB — LAMOTRIGINE SERPL-MCNC: 2.4 UG/ML

## 2025-01-29 ENCOUNTER — APPOINTMENT (OUTPATIENT)
Dept: URBAN - METROPOLITAN AREA CLINIC 260 | Age: 30
Setting detail: DERMATOLOGY
End: 2025-01-29

## 2025-01-29 DIAGNOSIS — Z71.89 OTHER SPECIFIED COUNSELING: ICD-10-CM

## 2025-01-29 DIAGNOSIS — L81.4 OTHER MELANIN HYPERPIGMENTATION: ICD-10-CM

## 2025-01-29 DIAGNOSIS — D49.2 NEOPLASM OF UNSPECIFIED BEHAVIOR OF BONE, SOFT TISSUE, AND SKIN: ICD-10-CM

## 2025-01-29 DIAGNOSIS — L82.1 OTHER SEBORRHEIC KERATOSIS: ICD-10-CM

## 2025-01-29 DIAGNOSIS — D18.0 HEMANGIOMA: ICD-10-CM

## 2025-01-29 DIAGNOSIS — D22 MELANOCYTIC NEVI: ICD-10-CM

## 2025-01-29 PROBLEM — D22.5 MELANOCYTIC NEVI OF TRUNK: Status: ACTIVE | Noted: 2025-01-29

## 2025-01-29 PROBLEM — D18.01 HEMANGIOMA OF SKIN AND SUBCUTANEOUS TISSUE: Status: ACTIVE | Noted: 2025-01-29

## 2025-01-29 PROCEDURE — 99203 OFFICE O/P NEW LOW 30 MIN: CPT | Mod: 25

## 2025-01-29 PROCEDURE — OTHER MIPS QUALITY: OTHER

## 2025-01-29 PROCEDURE — OTHER BIOPSY BY SHAVE METHOD: OTHER

## 2025-01-29 PROCEDURE — OTHER COUNSELING: OTHER

## 2025-01-29 PROCEDURE — 11102 TANGNTL BX SKIN SINGLE LES: CPT

## 2025-01-29 ASSESSMENT — LOCATION ZONE DERM: LOCATION ZONE: TRUNK

## 2025-01-29 ASSESSMENT — LOCATION SIMPLE DESCRIPTION DERM
LOCATION SIMPLE: UPPER BACK
LOCATION SIMPLE: LOWER BACK
LOCATION SIMPLE: RIGHT BACK

## 2025-01-29 ASSESSMENT — LOCATION DETAILED DESCRIPTION DERM
LOCATION DETAILED: SUPERIOR LUMBAR SPINE
LOCATION DETAILED: INFERIOR THORACIC SPINE
LOCATION DETAILED: RIGHT SUPERIOR LATERAL UPPER BACK

## 2025-08-09 ENCOUNTER — HEALTH MAINTENANCE LETTER (OUTPATIENT)
Age: 30
End: 2025-08-09